# Patient Record
Sex: MALE | Race: WHITE
[De-identification: names, ages, dates, MRNs, and addresses within clinical notes are randomized per-mention and may not be internally consistent; named-entity substitution may affect disease eponyms.]

---

## 2018-08-08 ENCOUNTER — HOSPITAL ENCOUNTER (OUTPATIENT)
Dept: HOSPITAL 92 - ULT | Age: 63
Discharge: HOME | End: 2018-08-08
Attending: FAMILY MEDICINE
Payer: COMMERCIAL

## 2018-08-08 DIAGNOSIS — I51.7: ICD-10-CM

## 2018-08-08 DIAGNOSIS — R55: Primary | ICD-10-CM

## 2018-08-08 PROCEDURE — 93306 TTE W/DOPPLER COMPLETE: CPT

## 2018-08-08 PROCEDURE — 93880 EXTRACRANIAL BILAT STUDY: CPT

## 2018-08-08 NOTE — ULT
BILATERAL CAROTID DUPLEX ULTRASOUND:

 

DATE:  08/08/18 

 

HISTORY:  

Syncope. 

 

TECHNIQUE:  

Gray scale ultrasound with color flow and spectral Doppler imaging of the extracranial carotid artery
 systems performed bilaterally. 

 

FINDINGS:

 

No significant plaque formation or intimal wall thickening is seen on either side. 

 

The peak systolic velocity in the right ICA measures 87 cm/second with an end-diastolic velocity of 3
0 cm/second and a systolic ratio of 1.08. 

 

The peak systolic velocity in the left ICA measures 59 cm/second with an end-diastolic velocity of 25
 cm/second and a systolic ratio of 0.72. 

 

Flow in both vertebral arteries remains antegrade. 

 

IMPRESSION: 

No evidence of hemodynamically significant stenosis.  

 

POS: OFF

## 2024-08-22 ENCOUNTER — HOSPITAL ENCOUNTER (OUTPATIENT)
Dept: HOSPITAL 92 - LABBT | Age: 69
Discharge: HOME | End: 2024-08-22
Attending: ORTHOPAEDIC SURGERY
Payer: COMMERCIAL

## 2024-08-22 DIAGNOSIS — G56.12: ICD-10-CM

## 2024-08-22 DIAGNOSIS — Z01.818: Primary | ICD-10-CM

## 2024-08-22 DIAGNOSIS — G56.22: ICD-10-CM

## 2024-08-22 DIAGNOSIS — G56.02: ICD-10-CM

## 2024-08-22 LAB
BASOPHILS # BLD AUTO: 0.06 10X3/UL (ref 0–0.2)
BASOPHILS NFR BLD AUTO: 0.8 % (ref 0–1)
EOSINOPHIL # BLD AUTO: 0.3 10X3/UL (ref 0–0.7)
EOSINOPHIL NFR BLD AUTO: 4.3 % (ref 0–10)
HCT VFR BLD CALC: 39.6 % (ref 42–52)
HGB BLD-MCNC: 13.3 G/DL (ref 14–18)
LYMPHOCYTES NFR BLD AUTO: 21.9 % (ref 21–51)
MCH RBC QN AUTO: 30.6 PG (ref 27–31)
MCV RBC AUTO: 91.2 FL (ref 78–98)
MONOCYTES # BLD AUTO: 0.7 10X3/UL (ref 0.11–0.59)
MONOCYTES NFR BLD AUTO: 8.7 % (ref 0–10)
NEUTROPHILS # BLD AUTO: 5 10X3/UL (ref 1.4–6.5)
NEUTROPHILS NFR BLD AUTO: 64 % (ref 42–75)
PLATELET # BLD AUTO: 220 10X3/UL (ref 130–400)
RBC # BLD AUTO: 4.34 MILL/UL (ref 4.7–6.1)
WBC # BLD AUTO: 7.8 10X3/UL (ref 4.8–10.8)

## 2024-08-22 PROCEDURE — 85025 COMPLETE CBC W/AUTO DIFF WBC: CPT

## 2024-08-22 PROCEDURE — 93005 ELECTROCARDIOGRAM TRACING: CPT

## 2024-08-22 PROCEDURE — 93010 ELECTROCARDIOGRAM REPORT: CPT
